# Patient Record
Sex: FEMALE
[De-identification: names, ages, dates, MRNs, and addresses within clinical notes are randomized per-mention and may not be internally consistent; named-entity substitution may affect disease eponyms.]

---

## 2023-05-06 ENCOUNTER — NURSE TRIAGE (OUTPATIENT)
Dept: OTHER | Facility: CLINIC | Age: 43
End: 2023-05-06

## 2023-05-06 NOTE — TELEPHONE ENCOUNTER
Location of patient: [place]    Subjective: Caller states \"I've been getting a rash off and on, that is made much worse whenever drinking ETOH. The rash is all over my face and neck, chest, back, both arms, both legs, and the palms of my hands. \"     Onset:  about a month ago it started, then it would go away, fade. Then it would start, like when I wasn't drinking ETOH. Started again today, 5/6/23, and has worsened all day. Pain Severity:   hands hurt, and it hurts to move her face    Temperature:  no fever    What has been tried: benadryl and allegra    Pregnant: No    Recommended disposition: Go to ED Now    Care advice provided, patient verbalizes understanding; denies any other questions or concerns; instructed to call back for any new or worsening symptoms. Pt to ED - gave her the names and addresses of three nearby facilities. This triage is a result of a call to [address] [place] [address]. Please do not respond to the triage nurse through this encounter. Any subsequent communication should be directly with the patient.     Reason for Disposition   Patient sounds very sick or weak to the triager    Protocols used: Rash or Redness - Widespread-ADULT-